# Patient Record
(demographics unavailable — no encounter records)

---

## 2024-11-15 NOTE — REVIEW OF SYSTEMS
You are cleared for surgery!  Follow-up with PA in 6 months with echocardiogram prior to visit    Patient Education     Chest Echocardiography (Transthoracic)     During an echo, images of your heart appear on a monitor.   An echocardiogram (echo) is an imaging test that uses ultrasound. It helps your healthcare provider look for symptoms that may be related how well your heart works.  A transthoracic echocardiogram is sometimes called TTE. It may also be called surface echocardiogram. This is because the images are taken from the surface of the chest wall. It helps your provider assess your heart. A more invasive type of echocardiogram involves the ultrasound probe being passed into the esophagus to get images (transesophageal).  This test:  · Is safe and generally painless  · May cause discomfort from the echo probe being pressed against the bony areas of the chest. This goes away once the probe is moved.  · Can be done in a hospital, test center, or doctor’s office  · Bounces harmless sound waves (ultrasound) off the heart using a device that looks like a microphone (transducer or probe)  · Allows your healthcare provider to look at the size and shape of your heart. The provider can also see the size, thickness, and movement of your heart's walls, and the heart's pumping strength.  · Shows if the heart valves are working correctly. It can also show if blood is leaking backward through your heart valves (regurgitation), or if the heart valves are too narrow (stenosis).  · Shows if you have a tumor or infectious growth around your heart valves  · Will help your healthcare provider find out if there are problems with the outer lining of your heart (pericardium)  · Shows problems with the large blood vessels that enter and leave the heart  · Shows blood clots in the heart chambers  · Shows abnormal holes between heart chambers  Before your echo  · Discuss any questions or concerns you have with your healthcare  [Negative] : Heme/Lymph provider.  · Mention any over-the-counter or prescription medicines, herbs, or supplements you’re taking.  · Allow extra time for checking in. Bring your insurance cards and ID.  · Wear a 2-piece outfit for the test. You may be asked to remove clothing and jewelry from the waist up. If so, you’ll be given a short hospital gown.  · An IV (intravenous) catheter may be put into a vein in your arm or hand. This will be done if contrast or bubbles will be injected during the study.    During your echo  · Small pads (electrodes) are placed on your chest to monitor your heartbeat.  · A transducer coated with gel is moved firmly over your chest. This device creates the sound waves that make images of your heart. If you are overweight, the technician may have to apply more pressure to the chest wall to improve the quality of the images. This pressure can be uncomfortable over bony areas. Tell your technician if you are uncomfortable.  · At times, you may be asked to exhale and hold your breath for a few seconds. Air in your lungs can affect the images.  · The transducer may also be used to do a Doppler study. This test measures the direction and speed of blood flowing through the heart. During the test, you may hear a “whooshing” sound. This is the sound of blood flowing through the heart.  · The technician may use IV contrast to improve the image quality. Or they may use agitated saline to follow blood flow through the chambers of the heart.  · The images of your heart are stored electronically. This is so your healthcare provider can review them later.    After your echo  · Return to normal activity unless your healthcare provider tells you otherwise.  · Keep any follow-up appointments.    Your test results  Your healthcare provider will discuss your test results with you during a future office visit. The test results help the healthcare provider plan your treatment and any other tests you need.  [place] last reviewed  this educational content on 4/1/2019 © 2000-2020 [employer]. [address] [place] [address], [place] [address]. All rights reserved. This information is not intended as a substitute for professional medical care. Always follow your healthcare professional's instructions.               Thank you for attending your Cardiology appointment today. We aim to make your appointment as positive as possible.    We hope that your questions have been answered and you understand any plans that have been discussed. If you have any questions or concerns please call us at your convenience at [phone] (ask to speak with Cardiology), this number will connect you directly with our nursing staff.    You may receive a survey about your experience with us today. If you cannot give us the highest score possible for each question, we would appreciate any feedback you can give us on how we can do better in the future.    Please finalize the following details regarding your follow up visit:    1. Date and location of your follow up visit. If you would like to schedule your upcoming appointment, please contact our Department at [phone]. We will be happy to address your questions and concerns.    2. Contact information. Please take a card with our contact information should you have any questions or concerns.     3. [facility] is now [employer]! Go to [email or web address] to sign up. Send messages to your doctor, view your test results, renew your prescriptions and more!    Thank You.    Pedrito BERNARDO MD  [address] [place] [address]  [phone]

## 2025-01-08 NOTE — ASSESSMENT
[FreeTextEntry1] : PENILE INJECTION TEST:  TYLER RAY  01/08/25   The patient was placed supine on the procedure table.  The left side of the penis was prepped with alcohol, and the patient received 10 mcg @  0.25 cc of Alprostadil. The patient tolerated the injection well.  No bleeding occurred at the injection site.     The patient was examined at 5, 10, 30 and 45 minutes interval post injection:   The patients penis was:   18-19   cm stretched  Deformity: Narrowing:  An hour glass deformity:  Curvature: to the LEFT  Post Injection Erectile Response: At 5 minutes:      20% rigid erection was noted.  At 15 minutes:   40% rigidity.   At 30 minutes:    % rigidity.  Spontaneous detumescence occurred after 60 minutes.    The patient was discharged with a soft erection and was advised to call should his erection become more rigid.  Post response evaluation by the patient: The patient described that this erection was better than his sexually induced erections.   The erection was not adequate for vaginal penetration. Impression:         Severe organic erectile dysfunction.  Recommendation:   Trial of alprostadil injection and/or insertion of inflatable implant  PENILE DUPLEX SONOGRAPHY WITH PULSED DOPPLER ANALYSIS: Procedure description: The flaccid penis was scanned with the 18 MHz probe and images obtained longitudinally.  The diameters of the corporal arteries were measured:   The right cavernosal artery measured: 0.63 mm The left cavernosal artery measured:  0.63  mm   Following intracavernous injection of alprostadil   10    microgram/ml in  0.25   ml, the penis was rescanned and the diameter of the cavernosal arteries were measured again to assess distensibility in response to the vasoactive medication. (A 100% increase in diameter is normally expected.)   The left cavernosal artery measured: 1.16  mm The right cavernosal artery measured:  1.40 mm   Pulsed Doppler analysis: Each of the selective imaged arteries underwent penile Doppler analysis with generation of waveform. The peak velocity data of the cavernosal arteries is tabulated below: (A velocity of 35 cm/s or more is normally expected.)  Resistive index parameters were obtained bilaterally (normal is 100%):   DOPPLER RESULTS UNOBTAINABLE: Results: Left cavernosal artery peak systolic velocity at 15 minutes:   centimeters per second Let cavernosal end-diastolic velocity at 15 minutes:                centimeters per second Left cavernosal artery resistive index:       %   Right cavernosal artery peak systolic velocity at 15 minutes:   centimeters per second Right end-diastolic velocity at 15 minutes:                                 centimeters per second Right cavernosal artery resistive index:      %   Spontaneous detumescence occurred after 60  minutes The patient was discharged with a soft erection and was advised to call should an erection persist for more than 4 hours.    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      After the Duplex study was terminated, I sat with the patient for 45 minutes and I explained to him the findings of the study. I also discussed his diagnosis with him as well as the recommended course of action. He understands the reason why he has ED and agrees with the plan of action.

## 2025-01-08 NOTE — HISTORY OF PRESENT ILLNESS
[FreeTextEntry1] : 63M w/ ED for Duplex pt is interested in IPP insertion (cardiac clearance received. neurology and hematology clearance pending)  - PMH: prostate cancer s/p EBRT/proton therapy 6/2023, GERD, thalassemia, thalamic stroke. No longer on Plavix, just on baby aspirin Proton therapy and follow up w/ Dr. Fenton at [facility] On TRT. Testosterone management w/ Dr. 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 Esquivel  - PSH: cholecystectomy, knee surgery, shoulder surgery, varicocelectomy, PFO closure ([facility] 6/11/24), thinks he had RIGHT hernia surgery with mesh (2019)

## 2025-01-08 NOTE — PHYSICAL EXAM
[General Appearance - Well Developed] : well developed [General Appearance - Well Nourished] : well nourished [Normal Appearance] : normal appearance [Well Groomed] : well groomed [Heart Rate And Rhythm] : heart rate and rhythm were normal [] : no respiratory distress [Respiration, Rhythm And Depth] : normal respiratory rhythm and effort [Bowel Sounds] : normal bowel sounds [Abdomen Soft] : soft [Chaperone Present] : A chaperone was present in the examining room during all aspects of the physical examination [Penis Abnormality] : normal circumcised penis [FreeTextEntry2] : Mathew Soriano NP

## 2025-01-08 NOTE — HISTORY OF PRESENT ILLNESS
[FreeTextEntry1] : 63M w/ ED for Duplex pt is interested in IPP insertion (cardiac clearance received. neurology and hematology clearance pending)  - PMH: prostate cancer s/p EBRT/proton therapy 6/2023, GERD, thalassemia, thalamic stroke. No longer on Plavix, just on baby aspirin Proton therapy and follow up w/ Dr. Fenton at [facility] On TRT. Testosterone management w/ Dr. 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 Esquivel  - PSH: cholecystectomy, knee surgery, shoulder surgery, varicocelectomy, PFO closure ([facility] 6/11/24), thinks he had RIGHT hernia surgery with mesh (2019)

## 2025-01-08 NOTE — HISTORY OF PRESENT ILLNESS
[FreeTextEntry1] : 63M w/ ED for Duplex pt is interested in IPP insertion (cardiac clearance received. neurology and hematology clearance pending)  - PMH: prostate cancer s/p EBRT/proton therapy 6/2023, GERD, thalassemia, thalamic stroke. No longer on Plavix, just on baby aspirin Proton therapy and follow up w/ Dr. Fenton at [facility] On TRT. Testosterone management w/ Dr. 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 Esquivel  - PSH: cholecystectomy, knee surgery, shoulder surgery, varicocelectomy, PFO closure ([facility] 6/11/24), thinks he had RIGHT hernia surgery with mesh (2019)

## 2025-01-08 NOTE — END OF VISIT
[Time Spent: ___ minutes] : I have spent [unfilled] minutes of time on the encounter which excludes teaching and separately reported services. [FreeTextEntry3] : The complete time calculation ( 67  minutes) for this patient encounter, which excludes teaching and separately reported services, but includes a review of the patient's past medical records pertinent to his chief complaint, including medical, and surgical history, review of medications taken and of previous visits with other health care providers. In addition to the time spent with the patient, the total time calculation also includes the time necessary to document all of the information gathered during this session into the patient's electronic health records.

## 2025-01-08 NOTE — HISTORY OF PRESENT ILLNESS
[FreeTextEntry1] : 63M w/ ED for Duplex pt is interested in IPP insertion (cardiac clearance received. neurology and hematology clearance pending)  - PMH: prostate cancer s/p EBRT/proton therapy 6/2023, GERD, thalassemia, thalamic stroke. No longer on Plavix, just on baby aspirin Proton therapy and follow up w/ Dr. Fenton at [facility] On TRT. Testosterone management w/ Dr. 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 Esquivel  - PSH: cholecystectomy, knee surgery, shoulder surgery, varicocelectomy, PFO closure ([facility] 6/11/24), thinks he had RIGHT hernia surgery with mesh (2019)

## 2025-01-08 NOTE — HISTORY OF PRESENT ILLNESS
[FreeTextEntry1] : 63M w/ ED for Duplex pt is interested in IPP insertion (cardiac clearance received. neurology and hematology clearance pending)  - PMH: prostate cancer s/p EBRT/proton therapy 6/2023, GERD, thalassemia, thalamic stroke. No longer on Plavix, just on baby aspirin Proton therapy and follow up w/ Dr. Fenton at [facility] On TRT. Testosterone management w/ Dr. 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 Esquivel  - PSH: cholecystectomy, knee surgery, shoulder surgery, varicocelectomy, PFO closure ([facility] 6/11/24), thinks he had RIGHT hernia surgery with mesh (2019)

## 2025-01-08 NOTE — PLAN
[TextEntry] : A/P, 63M w/ ED for Duplex - Duplex today - pt is interested in IPP insertion - cardiac clearance received - neurology clearance pending (anesthesia team is asking due to 6 months post CVA) - hematology clearance pending (as indicated on the cardiac note.)  Also needs a stress test and Echo - in the interim, pt will start penile injections of alprostadil, 20 mcg (or 0.5 mL from a 40mcg/mL vial)  The summary points of our discussion after the Duplex test are that: 1) The results of the Duplex study were reviewed with the patient and that his ailment is organic erectile dysfunction, refractory to other treatments, or at least other options were offered but rejected. 2) The proposed treatment is the inflatable, permanent, penile prosthesis that we have discussed in great detail at outlined in our previous discussion. 3) The probability of success is over 90%, but this depends on whether or not there are complications.  The complications can be very serious and certainly can occur, including infection, breakage, and injury to surrounding structures.  These events would quality as a "failure", and he understands this completely. 4) The risks are outlined above, but infection is 1-2% in the best circumstances, the chance of other events happening is low but possible, including urethral perforation, bladder perforation, or device breakage. With all of this stated, he decided in light of all of this discussion and different treatment options available, he would like to move forward with 3 piece inflatable penile prosthesis placement.  He was informed that penile implant surgery is an end of the line therapy, and once this is undertaken, one cannot go back and attempt to use injections or pills and expect these to work should the implant be removed for infection or fail to be satisfactory. He understands that he will need to be medically cleared before proceeding with scheduling a dated for the procedure. Following the Duplex study, I spent an additional 35 minutes with the patient.